# Patient Record
Sex: MALE | Race: WHITE | NOT HISPANIC OR LATINO | Employment: FULL TIME | URBAN - METROPOLITAN AREA
[De-identification: names, ages, dates, MRNs, and addresses within clinical notes are randomized per-mention and may not be internally consistent; named-entity substitution may affect disease eponyms.]

---

## 2019-01-03 ENCOUNTER — HISTORICAL (OUTPATIENT)
Dept: PREADMISSION TESTING | Facility: HOSPITAL | Age: 29
End: 2019-01-03

## 2019-01-03 ENCOUNTER — HISTORICAL (OUTPATIENT)
Dept: LAB | Facility: HOSPITAL | Age: 29
End: 2019-01-03

## 2019-01-03 LAB
ABS NEUT (OLG): 2.38 X10(3)/MCL (ref 2.1–9.2)
APTT PPP: 28 SECOND(S) (ref 24.8–36.9)
BASOPHILS # BLD AUTO: 0 X10(3)/MCL (ref 0–0.2)
BASOPHILS NFR BLD AUTO: 1 %
EOSINOPHIL # BLD AUTO: 0.3 X10(3)/MCL (ref 0–0.9)
EOSINOPHIL NFR BLD AUTO: 6 %
ERYTHROCYTE [DISTWIDTH] IN BLOOD BY AUTOMATED COUNT: 12.1 % (ref 11.5–17)
HCT VFR BLD AUTO: 46.3 % (ref 42–52)
HGB BLD-MCNC: 15.3 GM/DL (ref 14–18)
INR PPP: 1 (ref 0–1.3)
LYMPHOCYTES # BLD AUTO: 1.8 X10(3)/MCL (ref 0.6–4.6)
LYMPHOCYTES NFR BLD AUTO: 37 %
MCH RBC QN AUTO: 31.5 PG (ref 27–31)
MCHC RBC AUTO-ENTMCNC: 33 GM/DL (ref 33–36)
MCV RBC AUTO: 95.5 FL (ref 80–94)
MONOCYTES # BLD AUTO: 0.4 X10(3)/MCL (ref 0.1–1.3)
MONOCYTES NFR BLD AUTO: 8 %
NEUTROPHILS # BLD AUTO: 2.38 X10(3)/MCL (ref 2.1–9.2)
NEUTROPHILS NFR BLD AUTO: 49 %
PLATELET # BLD AUTO: 281 X10(3)/MCL (ref 130–400)
PMV BLD AUTO: 9.6 FL (ref 9.4–12.4)
PROTHROMBIN TIME: 13.7 SECOND(S) (ref 12.2–14.7)
RBC # BLD AUTO: 4.85 X10(6)/MCL (ref 4.7–6.1)
WBC # SPEC AUTO: 4.9 X10(3)/MCL (ref 4.5–11.5)

## 2019-01-23 ENCOUNTER — HISTORICAL (OUTPATIENT)
Dept: ADMINISTRATIVE | Facility: HOSPITAL | Age: 29
End: 2019-01-23

## 2020-08-03 ENCOUNTER — OFFICE VISIT (OUTPATIENT)
Dept: UROLOGY | Facility: CLINIC | Age: 30
End: 2020-08-03
Payer: COMMERCIAL

## 2020-08-03 VITALS
HEIGHT: 70 IN | WEIGHT: 180 LBS | HEART RATE: 83 BPM | SYSTOLIC BLOOD PRESSURE: 116 MMHG | RESPIRATION RATE: 18 BRPM | BODY MASS INDEX: 25.77 KG/M2 | DIASTOLIC BLOOD PRESSURE: 82 MMHG

## 2020-08-03 DIAGNOSIS — Z30.09 STERILIZATION CONSULT: Primary | ICD-10-CM

## 2020-08-03 PROCEDURE — 99204 PR OFFICE/OUTPT VISIT, NEW, LEVL IV, 45-59 MIN: ICD-10-PCS | Mod: S$GLB,,, | Performed by: UROLOGY

## 2020-08-03 PROCEDURE — 3008F BODY MASS INDEX DOCD: CPT | Mod: CPTII,S$GLB,, | Performed by: UROLOGY

## 2020-08-03 PROCEDURE — 99204 OFFICE O/P NEW MOD 45 MIN: CPT | Mod: S$GLB,,, | Performed by: UROLOGY

## 2020-08-03 PROCEDURE — 3008F PR BODY MASS INDEX (BMI) DOCUMENTED: ICD-10-PCS | Mod: CPTII,S$GLB,, | Performed by: UROLOGY

## 2020-08-03 RX ORDER — PHENYTOIN SODIUM 100 MG/1
CAPSULE, EXTENDED RELEASE ORAL
COMMUNITY
Start: 2020-07-15

## 2020-08-03 NOTE — PROGRESS NOTES
Subjective:       Patient ID: Missael Scott is a 30 y.o. male.    Chief Complaint: New Pt and Vas Consult (3.5 Children)      HPI: 31 yo male with 3.5 children desires a vasectomy.  Pt does not want wife to undergo gen anesthesia for a tubal, does not want her on birth control and he does not want to use condoms.  Pt also states that if the present pregnancy resulted in fetal demise they would not want to have a fourth child       Past Medical History:   Past Medical History:   Diagnosis Date    Seizure, petit mal        Past Surgical Historical:   Past Surgical History:   Procedure Laterality Date    NASAL SEPTUM SURGERY  01/2019    RHINOPLASTY  01/2019        Medications:   Medication List with Changes/Refills   Current Medications    DILANTIN EXTENDED 100 MG ER CAPSULE            Past Social History:   Social History     Socioeconomic History    Marital status:      Spouse name: Not on file    Number of children: Not on file    Years of education: Not on file    Highest education level: Not on file   Occupational History    Not on file   Social Needs    Financial resource strain: Not on file    Food insecurity     Worry: Not on file     Inability: Not on file    Transportation needs     Medical: Not on file     Non-medical: Not on file   Tobacco Use    Smoking status: Never Smoker   Substance and Sexual Activity    Alcohol use: Yes    Drug use: Not on file    Sexual activity: Not on file   Lifestyle    Physical activity     Days per week: Not on file     Minutes per session: Not on file    Stress: Not on file   Relationships    Social connections     Talks on phone: Not on file     Gets together: Not on file     Attends Sikhism service: Not on file     Active member of club or organization: Not on file     Attends meetings of clubs or organizations: Not on file     Relationship status: Not on file   Other Topics Concern    Not on file   Social History Narrative    Not on file        Allergies: Review of patient's allergies indicates:  No Known Allergies     Family History: History reviewed. No pertinent family history.     Review of Systems:  Review of Systems   Constitutional: Negative for activity change and appetite change.   HENT: Negative for congestion and dental problem.    Eyes: Negative for visual disturbance.   Respiratory: Negative for chest tightness and shortness of breath.    Cardiovascular: Negative for chest pain.   Gastrointestinal: Negative for abdominal distention and abdominal pain.   Genitourinary: Negative for decreased urine volume, difficulty urinating, discharge, dysuria, enuresis, flank pain, frequency, genital sores, hematuria, penile pain, penile swelling, scrotal swelling, testicular pain and urgency.   Musculoskeletal: Negative for back pain and neck pain.   Skin: Negative for color change.   Neurological: Negative for dizziness.   Hematological: Negative for adenopathy.   Psychiatric/Behavioral: Negative for agitation, behavioral problems and confusion.       Physical Exam:  Physical Exam   Nursing note and vitals reviewed.  Constitutional: He is oriented to person, place, and time. He appears well-developed.   HENT:   Head: Normocephalic.   Eyes: Pupils are equal, round, and reactive to light.   Neck: Normal range of motion. Neck supple.   Cardiovascular: Normal rate, regular rhythm and normal heart sounds.    Pulmonary/Chest: Effort normal and breath sounds normal.   Abdominal: Soft. Bowel sounds are normal.   Genitourinary:    Testes and penis normal.   Circumcised.         Musculoskeletal: Normal range of motion.   Neurological: He is alert and oriented to person, place, and time.   Skin: Skin is warm and dry.     Psychiatric: His behavior is normal.       Assessment/Plan:     undesired fertility--risks and benefits of procedure explained to patient.  Pt understands this to be a permanent procedure and desires to proceed.  Problem List Items Addressed  This Visit     None

## 2020-08-17 ENCOUNTER — CLINICAL SUPPORT (OUTPATIENT)
Dept: UROLOGY | Facility: CLINIC | Age: 30
End: 2020-08-17
Payer: COMMERCIAL

## 2020-08-17 DIAGNOSIS — Z30.09 STERILIZATION CONSULT: Primary | ICD-10-CM

## 2021-02-01 ENCOUNTER — TELEPHONE (OUTPATIENT)
Dept: UROLOGY | Facility: CLINIC | Age: 31
End: 2021-02-01

## 2021-02-02 ENCOUNTER — CLINICAL SUPPORT (OUTPATIENT)
Dept: UROLOGY | Facility: CLINIC | Age: 31
End: 2021-02-02
Payer: COMMERCIAL

## 2021-02-02 ENCOUNTER — OFFICE VISIT (OUTPATIENT)
Dept: UROLOGY | Facility: CLINIC | Age: 31
End: 2021-02-02
Payer: COMMERCIAL

## 2021-02-02 VITALS — WEIGHT: 180 LBS | BODY MASS INDEX: 25.77 KG/M2 | RESPIRATION RATE: 18 BRPM | HEIGHT: 70 IN

## 2021-02-02 DIAGNOSIS — N46.9 MALE STERILITY: Primary | ICD-10-CM

## 2021-02-02 DIAGNOSIS — Z30.2 STERILIZATION: Primary | ICD-10-CM

## 2021-02-02 PROCEDURE — 3008F BODY MASS INDEX DOCD: CPT | Mod: CPTII,S$GLB,, | Performed by: UROLOGY

## 2021-02-02 PROCEDURE — 99499 NO LOS: ICD-10-PCS | Mod: S$GLB,,, | Performed by: UROLOGY

## 2021-02-02 PROCEDURE — 99499 UNLISTED E&M SERVICE: CPT | Mod: S$GLB,,, | Performed by: UROLOGY

## 2021-02-02 PROCEDURE — 3008F PR BODY MASS INDEX (BMI) DOCUMENTED: ICD-10-PCS | Mod: CPTII,S$GLB,, | Performed by: UROLOGY

## 2021-02-09 ENCOUNTER — PROCEDURE VISIT (OUTPATIENT)
Dept: UROLOGY | Facility: CLINIC | Age: 31
End: 2021-02-09
Payer: COMMERCIAL

## 2021-02-09 VITALS
RESPIRATION RATE: 18 BRPM | HEIGHT: 70 IN | HEART RATE: 97 BPM | WEIGHT: 186 LBS | DIASTOLIC BLOOD PRESSURE: 76 MMHG | SYSTOLIC BLOOD PRESSURE: 134 MMHG | BODY MASS INDEX: 26.63 KG/M2 | OXYGEN SATURATION: 99 %

## 2021-02-09 DIAGNOSIS — Z30.2 STERILIZATION: Primary | ICD-10-CM

## 2021-02-09 PROCEDURE — 96372 PR INJECTION,THERAP/PROPH/DIAG2ST, IM OR SUBCUT: ICD-10-PCS | Mod: 59,S$GLB,, | Performed by: UROLOGY

## 2021-02-09 PROCEDURE — 96372 THER/PROPH/DIAG INJ SC/IM: CPT | Mod: 59,S$GLB,, | Performed by: UROLOGY

## 2021-02-09 PROCEDURE — 55250 VASECTOMY: ICD-10-PCS | Mod: S$GLB,,, | Performed by: UROLOGY

## 2021-02-09 PROCEDURE — 55250 REMOVAL OF SPERM DUCT(S): CPT | Mod: S$GLB,,, | Performed by: UROLOGY

## 2021-02-09 RX ORDER — MEPERIDINE HYDROCHLORIDE 25 MG/ML
25 INJECTION INTRAMUSCULAR; INTRAVENOUS; SUBCUTANEOUS
Status: COMPLETED | OUTPATIENT
Start: 2021-02-09 | End: 2021-02-09

## 2021-02-09 RX ORDER — PROMETHAZINE HYDROCHLORIDE 25 MG/ML
25 INJECTION, SOLUTION INTRAMUSCULAR; INTRAVENOUS
Status: COMPLETED | OUTPATIENT
Start: 2021-02-09 | End: 2021-02-09

## 2021-02-09 RX ADMIN — MEPERIDINE HYDROCHLORIDE 25 MG: 25 INJECTION INTRAMUSCULAR; INTRAVENOUS; SUBCUTANEOUS at 12:02

## 2021-02-09 RX ADMIN — PROMETHAZINE HYDROCHLORIDE 25 MG: 25 INJECTION, SOLUTION INTRAMUSCULAR; INTRAVENOUS at 12:02

## 2021-02-19 ENCOUNTER — OFFICE VISIT (OUTPATIENT)
Dept: UROLOGY | Facility: CLINIC | Age: 31
End: 2021-02-19
Payer: COMMERCIAL

## 2021-02-19 DIAGNOSIS — N46.9 MALE STERILITY: Primary | ICD-10-CM

## 2021-02-19 PROCEDURE — 99024 PR POST-OP FOLLOW-UP VISIT: ICD-10-PCS | Mod: S$GLB,,, | Performed by: UROLOGY

## 2021-02-19 PROCEDURE — 99024 POSTOP FOLLOW-UP VISIT: CPT | Mod: S$GLB,,, | Performed by: UROLOGY

## 2022-04-30 NOTE — OP NOTE
DATE OF SURGERY:    01/23/2019    SURGEON:  Sylvester Jay MD    ASSISTANT: Joanie Henning RN       PREOPERATIVE DIAGNOSIS:  Nasal obstruction, nasal deformity.    POSTOPERATIVE DIAGNOSIS:  Nasal obstruction, nasal deformity.    PROCEDURE:  Septoplasty, rhinoplasty, nasal valve reconstruction.    PROCEDURE IN DETAIL:  The patient was brought to the operating room and placed in supine position. After achieving general endotracheal anesthesia, eyes were protected with tape while a throat pack was placed in the oropharynx and 4% cocaine topical gel was used to decongest the nose. After sufficient time had elapsed to decongest the nose, 1% lidocaine with 1:100,000 epinephrine was injected into the soft tissues of the nose. The patient was prepped and draped for surgery. After allowing for vasoconstriction, an 11 blade was used to make an inverted-V mid columellar incision with a 15 blade with marginal incisions made bilaterally. The nasal skin and soft tissue envelope was elevated in an avascular plane. The periosteum of the nasal bones was then scored and elevated, and alar cartilage was then identified and  in the midline with iris scissors. Junction tunnels were made and bilateral mucoperichondrial flaps elevated, noting the septum to be dislocated over the left with a significant deviation over to the right which consisted primarily of maxillary crest and quadrilateral cartilage. Using a [place] elevator, a swinging door was created, redundant floor cartilage removed, and a floor chisel was used to remove the deviated portions of the crest. The septum was then sewn to the soft tissues overlying the nasal spine with a figure-of-eight 4-0 PDS suture. The caudal end of the septum was shortened slightly. Attention was to the dorsum which was lowered with Pierre saw and a 15 blade with fine alterations made with rasps and broken 11 blade. Medial osteotomies were then carried out in a fading pattern.   grafts were harvested from septal cartilage and placed in the upper lateral cartilages and septum with multiple 4-0 PDS sutures in mattress fashion. We then turned our attention to lower lateral cartilages. Using complete strip technique, they were narrowed to 8 mm with an 11 blade and iris scissors with care being taken to leave the mucosa intact. Due to the re-curvature of the distal end of the alar cartilage into the vestibule of the nose, alar strut grafts were placed bilaterally and sewn in place with multiple 5-0 PDS sutures after making a pre-formed pocket between the undersurface of the lower  __________ and mucosa. After symmetry was achieved, the medial crura were reapproximated with 5-0 PDS sutures over a columellar strut. The mucoperichondrial flaps were closed with horizontal mattress sutures of 4-0 plain catgut. Lateral osteotomies were carried out by making a stab incision anterior to the anterior surface of the inferior turbinate in high-low-high fashion  __________ fractures, narrowing the bridge. The columellar incision was closed with 5-0 fast-absorbing gut sutures. The marginal incisions were closed with 4-0 chromic sutures. A small amount of Achromycin was used to pack the nose. Standard Gelfoam, Steri-Strips and Aquaplast dressing was placed. Throat pack was removed and pharynx carefully suctioned. Tape was removed from the eyes. They were washed with BSS and cold compresses applied. The patient tolerated the procedure well and was awakened in the operating room and brought to the recovery room in stable condition.        ______________________________  MD RENE Hernandez/US  DD:  01/23/2019  Time:  10:03AM  DT:  01/23/2019  Time:  12:22PM  Job #:  [id number]